# Patient Record
Sex: MALE | Race: OTHER | NOT HISPANIC OR LATINO
[De-identification: names, ages, dates, MRNs, and addresses within clinical notes are randomized per-mention and may not be internally consistent; named-entity substitution may affect disease eponyms.]

---

## 2018-05-08 PROBLEM — Z00.00 ENCOUNTER FOR PREVENTIVE HEALTH EXAMINATION: Status: ACTIVE | Noted: 2018-05-08

## 2019-05-10 ENCOUNTER — RESULT REVIEW (OUTPATIENT)
Age: 34
End: 2019-05-10

## 2019-05-10 ENCOUNTER — OUTPATIENT (OUTPATIENT)
Dept: OUTPATIENT SERVICES | Facility: HOSPITAL | Age: 34
LOS: 1 days | End: 2019-05-10
Payer: COMMERCIAL

## 2019-05-10 ENCOUNTER — APPOINTMENT (OUTPATIENT)
Dept: ULTRASOUND IMAGING | Facility: CLINIC | Age: 34
End: 2019-05-10
Payer: COMMERCIAL

## 2019-05-10 DIAGNOSIS — Z00.8 ENCOUNTER FOR OTHER GENERAL EXAMINATION: ICD-10-CM

## 2019-05-10 PROCEDURE — 88173 CYTOPATH EVAL FNA REPORT: CPT

## 2019-05-10 PROCEDURE — 10005 FNA BX W/US GDN 1ST LES: CPT

## 2019-05-10 PROCEDURE — 88173 CYTOPATH EVAL FNA REPORT: CPT | Mod: 26

## 2019-05-13 LAB — NON-GYNECOLOGICAL CYTOLOGY STUDY: SIGNIFICANT CHANGE UP

## 2020-10-19 ENCOUNTER — OUTPATIENT (OUTPATIENT)
Dept: OUTPATIENT SERVICES | Facility: HOSPITAL | Age: 35
LOS: 1 days | End: 2020-10-19
Payer: COMMERCIAL

## 2020-10-19 ENCOUNTER — APPOINTMENT (OUTPATIENT)
Dept: ULTRASOUND IMAGING | Facility: CLINIC | Age: 35
End: 2020-10-19
Payer: COMMERCIAL

## 2020-10-19 DIAGNOSIS — Z00.8 ENCOUNTER FOR OTHER GENERAL EXAMINATION: ICD-10-CM

## 2020-10-19 PROCEDURE — 76536 US EXAM OF HEAD AND NECK: CPT | Mod: 26

## 2020-10-19 PROCEDURE — 76536 US EXAM OF HEAD AND NECK: CPT

## 2022-05-20 ENCOUNTER — APPOINTMENT (OUTPATIENT)
Dept: PAIN MANAGEMENT | Facility: CLINIC | Age: 37
End: 2022-05-20

## 2022-11-01 ENCOUNTER — APPOINTMENT (OUTPATIENT)
Dept: BARIATRICS | Facility: CLINIC | Age: 37
End: 2022-11-01
Payer: COMMERCIAL

## 2022-11-01 ENCOUNTER — APPOINTMENT (OUTPATIENT)
Age: 37
End: 2022-11-01
Payer: COMMERCIAL

## 2022-11-01 VITALS
WEIGHT: 217 LBS | TEMPERATURE: 97.6 F | SYSTOLIC BLOOD PRESSURE: 137 MMHG | BODY MASS INDEX: 31.07 KG/M2 | HEART RATE: 90 BPM | HEIGHT: 70 IN | DIASTOLIC BLOOD PRESSURE: 93 MMHG | OXYGEN SATURATION: 96 %

## 2022-11-01 DIAGNOSIS — Z85.850 PERSONAL HISTORY OF MALIGNANT NEOPLASM OF THYROID: ICD-10-CM

## 2022-11-01 PROCEDURE — ZZZZZ: CPT

## 2022-11-01 PROCEDURE — G0447 BEHAVIOR COUNSEL OBESITY 15M: CPT | Mod: 59

## 2022-11-01 PROCEDURE — 99204 OFFICE O/P NEW MOD 45 MIN: CPT

## 2022-11-01 PROCEDURE — 36415 COLL VENOUS BLD VENIPUNCTURE: CPT

## 2022-11-01 RX ORDER — PAROXETINE HYDROCHLORIDE 37.5 MG/1
37.5 TABLET, FILM COATED, EXTENDED RELEASE ORAL
Refills: 0 | Status: ACTIVE | COMMUNITY

## 2022-11-01 RX ORDER — ALPRAZOLAM 0.5 MG/1
0.5 TABLET ORAL
Qty: 20 | Refills: 0 | Status: ACTIVE | COMMUNITY
Start: 2022-10-04

## 2022-11-01 RX ORDER — ATOGEPANT 60 MG/1
60 TABLET ORAL
Qty: 30 | Refills: 0 | Status: ACTIVE | COMMUNITY
Start: 2022-06-19

## 2022-11-01 RX ORDER — LEVOTHYROXINE SODIUM 175 UG/1
175 CAPSULE ORAL
Qty: 30 | Refills: 0 | Status: ACTIVE | COMMUNITY
Start: 2022-02-14

## 2022-11-12 NOTE — HISTORY OF PRESENT ILLNESS
[Improved Health] : Improved health [Quality of Life] : Quality of life [Improved Mobility] : Improved mobility [Commerial Program: _____] : commercial program: [unfilled] [Cravings] : cravings [Portions/overeating] : portions/overeating [5] : 5 [2+ miles] : Walking distance capability: 2+ miles [Sports] : sports [Other: ___] : [unfilled] [None] : none [FreeTextEntry2] : 185 [FreeTextEntry3] : 240 [0] : 0 [] : No [FreeTextEntry1] : 37 year old male with anxiety and h/o thyroid cancer presents for evaluation of obesity. He reports that he was always in good shape. However, in 2019 he was found to have Papillary thyroid carcinoma. He reports he had a total thyroidectomy but no TILLMAN. He is followed by Dr. Leonardo (Endocrine). He states he is complaint with taking Levothyroxine 175 mcg daily. Recent labs reviewed on patient's phone show his TSH is appropriately mildly suppressed. Denies any palpitations or other s/sx of hyperthyroidism. Prior to surgery he weighed 185 lbs. Since surgery his weight went up to 240 lbs. He has since dropped down to 217 lb by making dietary changes but would like to lose more weight. He works as a  and has most of his meals [place] the [employer]. He does make an effort to have healthier options but it is not always possible. He does on occasion work overnights. He is sedentary and does not exercise. IN the past he did try WW but was not successful. \par Recent labs from PCP were reviewed and discussed. \par His BP is somewhat elevated today. He feels well and denies any chest pain, palpitations or shortness of breath.

## 2022-11-12 NOTE — REVIEW OF SYSTEMS
[Patient Intake Form Reviewed] : Patient intake form was reviewed [Negative] : Heme/Lymph [MED-ROS-Cons-FT] : fatigue at times [MED-ROS-Allun-FT] : breana [MED-ROS-Neuro-FT] : migraines [MED-ROS-Musclo-FT] : b/l knee/hip pain [MED-ROS-Psych-FT] : anxiety [MED-ROS-Endo-FT] : h/o papillary thyroid cancer

## 2022-11-12 NOTE — ASSESSMENT
[FreeTextEntry1] : Patient with h/o papillary thyroid carcinoma, anxiety and obesity. We discussed the importance of weight loss in the management of his comorbidities. We discussed the risks of continued excess weight on long term health. Explained to the patient that if his TFTs are wnl he should not be experiencing weight gain due to his thyroid. Explained weight gain is related to lifestyle. Discussed lifestyle modification.  We discussed at length the importance of following a carbohydrate balanced diet and the importance of incorporating protein in meals. We also discussed appropriate alternative food choices. He met with the RD today as well. We discussed ways he can incorporate exercise into his daily routine. \par Pending today's labs will consider starting on medication to help with weight loss. \par \par We did discuss that Paxil can contribute to weight gain.\par \par BP mildly elevated will continue to monitor. Low sodium diet discussed.\par \par At least 15 minutes was spent during the visit on obesity counseling.\par All of his questions were answered and emotional support was provided.\par \par Fasting labs done today in the office, will call with results. \par

## 2022-11-15 LAB
ALBUMIN SERPL ELPH-MCNC: 5.1 G/DL
ALP BLD-CCNC: 87 U/L
ALT SERPL-CCNC: 32 U/L
ANION GAP SERPL CALC-SCNC: 15 MMOL/L
AST SERPL-CCNC: 23 U/L
BASOPHILS # BLD AUTO: 0.05 K/UL
BASOPHILS NFR BLD AUTO: 0.6 %
BILIRUB SERPL-MCNC: 0.4 MG/DL
BUN SERPL-MCNC: 11 MG/DL
CALCIUM SERPL-MCNC: 10 MG/DL
CHLORIDE SERPL-SCNC: 98 MMOL/L
CHOLEST SERPL-MCNC: 181 MG/DL
CO2 SERPL-SCNC: 29 MMOL/L
CREAT SERPL-MCNC: 1 MG/DL
EGFR: 98 ML/MIN/1.73M2
EOSINOPHIL # BLD AUTO: 0.13 K/UL
EOSINOPHIL NFR BLD AUTO: 1.5 %
ESTIMATED AVERAGE GLUCOSE: 103 MG/DL
GLUCOSE SERPL-MCNC: 54 MG/DL
HBA1C MFR BLD HPLC: 5.2 %
HCT VFR BLD CALC: 55.7 %
HDLC SERPL-MCNC: 57 MG/DL
HGB BLD-MCNC: 18.2 G/DL
IMM GRANULOCYTES NFR BLD AUTO: 0.6 %
INSULIN P FAST SERPL-ACNC: 25.5 UU/ML
LDLC SERPL CALC-MCNC: 91 MG/DL
LYMPHOCYTES # BLD AUTO: 2.92 K/UL
LYMPHOCYTES NFR BLD AUTO: 33.1 %
MAN DIFF?: NORMAL
MCHC RBC-ENTMCNC: 30.8 PG
MCHC RBC-ENTMCNC: 32.7 GM/DL
MCV RBC AUTO: 94.2 FL
MONOCYTES # BLD AUTO: 0.77 K/UL
MONOCYTES NFR BLD AUTO: 8.7 %
NEUTROPHILS # BLD AUTO: 4.89 K/UL
NEUTROPHILS NFR BLD AUTO: 55.5 %
NONHDLC SERPL-MCNC: 124 MG/DL
PLATELET # BLD AUTO: 302 K/UL
POTASSIUM SERPL-SCNC: 4.2 MMOL/L
PROT SERPL-MCNC: 7.7 G/DL
RBC # BLD: 5.91 M/UL
RBC # FLD: 13.5 %
SODIUM SERPL-SCNC: 142 MMOL/L
TRIGL SERPL-MCNC: 163 MG/DL
WBC # FLD AUTO: 8.81 K/UL

## 2022-12-22 ENCOUNTER — APPOINTMENT (OUTPATIENT)
Dept: BARIATRICS | Facility: CLINIC | Age: 37
End: 2022-12-22

## 2022-12-22 ENCOUNTER — APPOINTMENT (OUTPATIENT)
Age: 37
End: 2022-12-22
Payer: COMMERCIAL

## 2022-12-22 VITALS
SYSTOLIC BLOOD PRESSURE: 141 MMHG | BODY MASS INDEX: 31.21 KG/M2 | WEIGHT: 218 LBS | OXYGEN SATURATION: 97 % | HEART RATE: 82 BPM | DIASTOLIC BLOOD PRESSURE: 95 MMHG | TEMPERATURE: 97.6 F | HEIGHT: 70 IN

## 2022-12-22 PROCEDURE — ZZZZZ: CPT

## 2022-12-22 PROCEDURE — 99214 OFFICE O/P EST MOD 30 MIN: CPT

## 2022-12-22 PROCEDURE — G0447 BEHAVIOR COUNSEL OBESITY 15M: CPT | Mod: 59

## 2022-12-23 NOTE — HISTORY OF PRESENT ILLNESS
[FreeTextEntry1] : 37 year old male with anxiety and h/o thyroid cancer presents for evaluation of obesity. He reports that he was always in good shape. However, in 2019 he was found to have Papillary thyroid carcinoma. He reports he had a total thyroidectomy but no TILLMAN. He is followed by Dr. Leonardo (Endocrine). He states he is complaint with taking Levothyroxine 175 mcg daily. Recent labs reviewed on patient's phone show his TSH is appropriately mildly suppressed. Denies any palpitations or other s/sx of hyperthyroidism. Prior to surgery he weighed 185 lbs. Since surgery his weight went up to 240 lbs. He has since dropped down to 217 lb by making dietary changes but would like to lose more weight. He works as a  and has most of his meals at the [employer]. He does make an effort to have healthier options but it is not always possible. He does on occasion work overnights. He is sedentary and does not exercise. IN the past he did try WW but was not successful. \par Recent labs from PCP were reviewed and discussed. \par His BP is somewhat elevated today. He feels well and denies any chest pain, palpitations or shortness of breath. \par \par Since his last visit, he has been taking Metformin 500 mg BID to help with insulin resistance. He feels that it has helped greatly with his cravings for sweets. His weight is stable. He has been trying to make healthier choices at the [employer]. He has not been able to exercise consistently.

## 2022-12-23 NOTE — ASSESSMENT
[FreeTextEntry1] : Patient with h/o papillary thyroid carcinoma, anxiety and obesity. We discussed the importance of weight loss in the management of his comorbidities. We discussed the risks of continued excess weight on long term health. Discussed additional lifestyle modification. We discussed at length the importance of following a carbohydrate balanced diet and the importance of incorporating protein in meals. We also discussed appropriate alternative food choices. He met with the RD today as well. We discussed ways he can incorporate exercise into his daily routine. \par \par Will continue Metformin 500 mg BID. Will add on Mounjaro 2.5 mg weekly to help with insulin resistance and weight loss. Risks and benefits of therapy with Mounjaro were discussed. The proper way to take the medication, as well as, special considerations while on it were also discussed. The patient was taught how to use Mounjaro. We reviewed the injection site and technique, storage of the medication, dosing amount, time of administration and disposal of sharps. The risks and benefits of therapy with Mounjaro were discussed, as well as, possible side effects. Signs and symptoms of pancreatitis were reviewed as well. \par \par He will speak with his Endocrinologist to confirm that it is ok for him to start Mounjaro. \par \par We did discuss that Paxil can contribute to weight gain.\par \par BP mildly elevated will continue to monitor. Low sodium diet discussed.\par \par At least 15 minutes was spent during the visit on obesity counseling.\par All of his questions were answered and emotional support was provided.\par \par f/u in 6 weeks

## 2023-01-11 ENCOUNTER — RX RENEWAL (OUTPATIENT)
Age: 38
End: 2023-01-11

## 2023-02-10 ENCOUNTER — APPOINTMENT (OUTPATIENT)
Dept: BARIATRICS | Facility: CLINIC | Age: 38
End: 2023-02-10

## 2023-03-16 ENCOUNTER — RX RENEWAL (OUTPATIENT)
Age: 38
End: 2023-03-16

## 2023-04-23 ENCOUNTER — RX RENEWAL (OUTPATIENT)
Age: 38
End: 2023-04-23

## 2023-04-25 ENCOUNTER — APPOINTMENT (OUTPATIENT)
Dept: BARIATRICS/WEIGHT MGMT | Facility: CLINIC | Age: 38
End: 2023-04-25

## 2023-04-27 ENCOUNTER — APPOINTMENT (OUTPATIENT)
Dept: BARIATRICS/WEIGHT MGMT | Facility: CLINIC | Age: 38
End: 2023-04-27

## 2023-05-01 ENCOUNTER — APPOINTMENT (OUTPATIENT)
Dept: BARIATRICS/WEIGHT MGMT | Facility: CLINIC | Age: 38
End: 2023-05-01
Payer: COMMERCIAL

## 2023-05-01 VITALS
DIASTOLIC BLOOD PRESSURE: 85 MMHG | OXYGEN SATURATION: 98 % | WEIGHT: 213.19 LBS | SYSTOLIC BLOOD PRESSURE: 138 MMHG | HEIGHT: 70 IN | TEMPERATURE: 98.2 F | HEART RATE: 98 BPM | BODY MASS INDEX: 30.52 KG/M2

## 2023-05-01 PROCEDURE — 99213 OFFICE O/P EST LOW 20 MIN: CPT

## 2023-05-01 PROCEDURE — G0447 BEHAVIOR COUNSEL OBESITY 15M: CPT | Mod: 59

## 2023-05-01 NOTE — ASSESSMENT
[FreeTextEntry1] : Patient with h/o papillary thyroid carcinoma, anxiety and obesity. Spent time discussing the importance of lifestyle modifications. Discussed additional dietary changes he can incorporate. Reviewed importance of adequate protein intake to help build muscle while exercising. Also reviewed including Cardio in his exercise routine. At least 15 minutes was spent during the visit on obesity counseling. \par \par Will continue Metformin 500 mg BID and resume Mounjaro 7.5 mg weekly. Discussed the continued off label use of Mounjaro for insulin resistance. He understands the risks and prefers to continue. Will send over labs from PCP for my review given his h/o Papillary thyroid cancer. \par \par We did discuss that Paxil can contribute to weight gain.\par \par BP mildly elevated will continue to monitor. Low sodium diet discussed.\par \par All of his questions were answered and emotional support was provided.\par \par f/u in 3 months \par

## 2023-05-01 NOTE — HISTORY OF PRESENT ILLNESS
[FreeTextEntry1] : 37 year old male with anxiety and h/o thyroid cancer presents for evaluation of obesity. He reports that he was always in good shape. However, in 2019 he was found to have Papillary thyroid carcinoma. He reports he had a total thyroidectomy but no TILLMAN. He is followed by Dr. Leonardo (Endocrine). He states he is complaint with taking Levothyroxine 175 mcg daily. Recent labs reviewed on patient's phone show his TSH is appropriately mildly suppressed. Denies any palpitations or other s/sx of hyperthyroidism. Prior to surgery he weighed 185 lbs. Since surgery his weight went up to 240 lbs. He has since dropped down to 217 lb by making dietary changes but would like to lose more weight. He works as a  and has most of his meals at the [employer]. He does make an effort to have healthier options but it is not always possible. He does on occasion work overnights. He is sedentary and does not exercise. IN the past he did try WW but was not successful. \par Recent labs from PCP were reviewed and discussed. \par His BP is somewhat elevated today. He feels well and denies any chest pain, palpitations or shortness of breath. \par \par Since his last visit, he has been taking Metformin 500 mg BID  and Mounjaro to help with insulin resistance. He was taking Mounjaro 7.5 mg weekly. He felt great and had a lot of energy. He reports that he also lost weight and was down to 195 lbs. About 3 weeks ago he was unable to get the medication and his weight increased by 10 lbs. Today on his home scale he weighed 205 lbs. He is frustrated that he regained the weight so quickly. 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 He eats at the [employer] but does try to make healthier choices. He endorses enjoying fruits rather than sweets now. He started exercising with a personal golf  several times a week. They focus on strength training and golfing. \par Recently had labs with PCP, was told all was wnl.

## 2023-06-11 ENCOUNTER — RX RENEWAL (OUTPATIENT)
Age: 38
End: 2023-06-11

## 2023-08-08 ENCOUNTER — APPOINTMENT (OUTPATIENT)
Dept: BARIATRICS/WEIGHT MGMT | Facility: CLINIC | Age: 38
End: 2023-08-08
Payer: COMMERCIAL

## 2023-08-08 VITALS
DIASTOLIC BLOOD PRESSURE: 93 MMHG | HEART RATE: 72 BPM | OXYGEN SATURATION: 100 % | TEMPERATURE: 98.1 F | WEIGHT: 178.31 LBS | HEIGHT: 70 IN | BODY MASS INDEX: 25.53 KG/M2 | SYSTOLIC BLOOD PRESSURE: 127 MMHG

## 2023-08-08 PROCEDURE — G0447 BEHAVIOR COUNSEL OBESITY 15M: CPT | Mod: 59

## 2023-08-08 PROCEDURE — 99213 OFFICE O/P EST LOW 20 MIN: CPT

## 2023-08-08 NOTE — HISTORY OF PRESENT ILLNESS
[FreeTextEntry1] : 37 year old male with anxiety and h/o thyroid cancer presents for evaluation of obesity. He reports that he was always in good shape. However, in 2019 he was found to have Papillary thyroid carcinoma. He reports he had a total thyroidectomy but no TILLMAN. He is followed by Dr. Leonardo (Endocrine). He states he is complaint with taking Levothyroxine 175 mcg daily. Recent labs reviewed on patient's phone show his TSH is appropriately mildly suppressed. Denies any palpitations or other s/sx of hyperthyroidism. Prior to surgery he weighed 185 lbs. Since surgery his weight went up to 240 lbs. He has since dropped down to 217 lb by making dietary changes but would like to lose more weight. He works as a  and has most of his meals at the [employer]. He does make an effort to have healthier options but it is not always possible. He does on occasion work overnights. He is sedentary and does not exercise. IN the past he did try WW but was not successful.  Recent labs from PCP were reviewed and discussed.  His BP is somewhat elevated today. He feels well and denies any chest pain, palpitations or shortness of breath.   8/8/23 Since his last visit, he has been taking Metformin 500 mg BID and Mounjaro to help with insulin resistance. He was taking Mounjaro 7.5 mg weekly. He feels great. Reports having good energy. Since his last visit, he injured his shoulder while at work and has been home. He started running to exercise. He continues to work with the  where he focuses on strength training. Continues to make healthy choices.  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

## 2023-08-08 NOTE — ASSESSMENT
[FreeTextEntry1] : Patient with h/o papillary thyroid carcinoma, anxiety and obesity. Continue to focus on following a healthy lifestyle. At least 15 minutes was spent during the visit on obesity counseling.  Will continue Metformin 500 mg BID and Mounjaro 7.5 mg weekly. Discussed the continued off label use of Mounjaro for insulin resistance. He understands the risks and prefers to continue. Will send over labs from fire dept physical. Advised that given significant weight loss Levothyroxine dose may need to be adjusted and given his h/o Papillary thyroid cancer.   BP mildly elevated will continue to monitor. Low sodium diet discussed. He is drinking an energy drink today. Advised against it.   All of his questions were answered, and emotional support was provided.  f/u in 3 months

## 2023-08-11 RX ORDER — METFORMIN ER 500 MG 500 MG/1
500 TABLET ORAL
Qty: 180 | Refills: 2 | Status: ACTIVE | COMMUNITY
Start: 2022-11-15 | End: 1900-01-01

## 2023-10-25 ENCOUNTER — NON-APPOINTMENT (OUTPATIENT)
Age: 38
End: 2023-10-25

## 2023-10-30 ENCOUNTER — APPOINTMENT (OUTPATIENT)
Dept: SPINE | Facility: CLINIC | Age: 38
End: 2023-10-30
Payer: COMMERCIAL

## 2023-10-30 ENCOUNTER — APPOINTMENT (OUTPATIENT)
Dept: SPINE | Facility: CLINIC | Age: 38
End: 2023-10-30

## 2023-10-30 VITALS
SYSTOLIC BLOOD PRESSURE: 115 MMHG | DIASTOLIC BLOOD PRESSURE: 85 MMHG | HEART RATE: 90 BPM | WEIGHT: 180 LBS | BODY MASS INDEX: 25.77 KG/M2 | HEIGHT: 70 IN | OXYGEN SATURATION: 99 %

## 2023-10-30 DIAGNOSIS — S32.009A UNSPECIFIED FRACTURE OF UNSPECIFIED LUMBAR VERTEBRA, INITIAL ENCOUNTER FOR CLOSED FRACTURE: ICD-10-CM

## 2023-10-30 PROCEDURE — 99203 OFFICE O/P NEW LOW 30 MIN: CPT

## 2023-10-30 RX ORDER — TRAMADOL HYDROCHLORIDE 50 MG/1
50 TABLET, COATED ORAL
Qty: 40 | Refills: 0 | Status: DISCONTINUED | COMMUNITY
Start: 2022-10-18 | End: 2023-10-30

## 2023-11-09 ENCOUNTER — RX RENEWAL (OUTPATIENT)
Age: 38
End: 2023-11-09

## 2023-11-22 ENCOUNTER — APPOINTMENT (OUTPATIENT)
Dept: BARIATRICS | Facility: CLINIC | Age: 38
End: 2023-11-22

## 2023-11-30 ENCOUNTER — APPOINTMENT (OUTPATIENT)
Dept: SPINE | Facility: CLINIC | Age: 38
End: 2023-11-30

## 2023-12-06 ENCOUNTER — RX RENEWAL (OUTPATIENT)
Age: 38
End: 2023-12-06

## 2023-12-21 RX ORDER — TIRZEPATIDE 10 MG/.5ML
10 INJECTION, SOLUTION SUBCUTANEOUS
Qty: 2 | Refills: 0 | Status: DISCONTINUED | COMMUNITY
Start: 2022-12-22 | End: 2023-12-21

## 2023-12-29 ENCOUNTER — APPOINTMENT (OUTPATIENT)
Dept: BARIATRICS | Facility: CLINIC | Age: 38
End: 2023-12-29
Payer: COMMERCIAL

## 2023-12-29 VITALS
BODY MASS INDEX: 27.98 KG/M2 | OXYGEN SATURATION: 97 % | SYSTOLIC BLOOD PRESSURE: 122 MMHG | HEART RATE: 79 BPM | HEIGHT: 70 IN | TEMPERATURE: 97.6 F | WEIGHT: 195.44 LBS | DIASTOLIC BLOOD PRESSURE: 83 MMHG

## 2023-12-29 DIAGNOSIS — E89.0 POSTPROCEDURAL HYPOTHYROIDISM: ICD-10-CM

## 2023-12-29 DIAGNOSIS — E88.819 INSULIN RESISTANCE, UNSPECIFIED: ICD-10-CM

## 2023-12-29 DIAGNOSIS — F41.9 ANXIETY DISORDER, UNSPECIFIED: ICD-10-CM

## 2023-12-29 DIAGNOSIS — E66.9 OBESITY, UNSPECIFIED: ICD-10-CM

## 2023-12-29 PROCEDURE — G0447 BEHAVIOR COUNSEL OBESITY 15M: CPT | Mod: 59

## 2023-12-29 PROCEDURE — 99214 OFFICE O/P EST MOD 30 MIN: CPT

## 2023-12-29 NOTE — ASSESSMENT
[FreeTextEntry1] : Patient with h/o papillary thyroid carcinoma, anxiety and obesity. Continue to focus on following a healthy lifestyle. At least 15 minutes was spent during the visit on obesity counseling.  Will continue Metformin 500 mg BID and Zepbound 10 mg weekly.   BP wnl  All of his questions were answered, and emotional support was provided.  Fasting labs done today in the office, will call with results.   f/u in 6 months.

## 2023-12-29 NOTE — HISTORY OF PRESENT ILLNESS
[FreeTextEntry1] : 37 year old male with anxiety and h/o thyroid cancer presents for evaluation of obesity. He reports that he was always in good shape. However, in 2019 he was found to have Papillary thyroid carcinoma. He reports he had a total thyroidectomy but no TILLMAN. He is followed by Dr. Leonardo (Endocrine). He states he is complaint with taking Levothyroxine 175 mcg daily. Recent labs reviewed on patient's phone show his TSH is appropriately mildly suppressed. Denies any palpitations or other s/sx of hyperthyroidism. Prior to surgery he weighed 185 lbs. Since surgery his weight went up to 240 lbs. He has since dropped down to 217 lb by making dietary changes but would like to lose more weight. He works as a  and has most of his meals at the [employer]. He does make an effort to have healthier options but it is not always possible. He does on occasion work overnights. He is sedentary and does not exercise. IN the past he did try WW but was not successful.  Recent labs from PCP were reviewed and discussed.  His BP is somewhat elevated today. He feels well and denies any chest pain, palpitations or shortness of breath.   8/8/23 Since his last visit, he has been taking Metformin 500 mg BID and Mounjaro to help with insulin resistance. He was taking Mounjaro 7.5 mg weekly. He feels great. Reports having good energy. Since his last visit, he injured his shoulder while at work and has been home. He started running to exercise. He continues to work with the  where he focuses on strength training. Continues to make healthy choices.  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   12/29/23 Since his last visit, he has been struggling with exercise as he underwent 2 should surgeries. During his recovery of the second shoulder he fell and fractured his lumbar vertebra. Currently he attends PT but is not allowed to exercise. Reports he has been trying to keep up with dietary changes but has been struggling due to stress. He has gained some weight since his last visit.  He was taking Mounjaro 7.5 mg weekly. Recently dose was increased, and patient switched to Zepbound.

## 2024-01-04 LAB
25(OH)D3 SERPL-MCNC: 29.7 NG/ML
ALBUMIN SERPL ELPH-MCNC: 5.2 G/DL
ALP BLD-CCNC: 77 U/L
ALT SERPL-CCNC: 38 U/L
ANION GAP SERPL CALC-SCNC: 13 MMOL/L
AST SERPL-CCNC: 26 U/L
BASOPHILS # BLD AUTO: 0.05 K/UL
BASOPHILS NFR BLD AUTO: 0.8 %
BILIRUB SERPL-MCNC: 0.8 MG/DL
BUN SERPL-MCNC: 14 MG/DL
CALCIUM SERPL-MCNC: 9.7 MG/DL
CHLORIDE SERPL-SCNC: 103 MMOL/L
CHOLEST SERPL-MCNC: 219 MG/DL
CO2 SERPL-SCNC: 24 MMOL/L
CREAT SERPL-MCNC: 0.84 MG/DL
EGFR: 113 ML/MIN/1.73M2
EOSINOPHIL # BLD AUTO: 0.08 K/UL
EOSINOPHIL NFR BLD AUTO: 1.2 %
ESTIMATED AVERAGE GLUCOSE: 97 MG/DL
FOLATE SERPL-MCNC: 15.3 NG/ML
GLUCOSE SERPL-MCNC: 90 MG/DL
HBA1C MFR BLD HPLC: 5 %
HCT VFR BLD CALC: 49.2 %
HDLC SERPL-MCNC: 62 MG/DL
HGB BLD-MCNC: 16.8 G/DL
IMM GRANULOCYTES NFR BLD AUTO: 0.3 %
LDLC SERPL CALC-MCNC: 148 MG/DL
LYMPHOCYTES # BLD AUTO: 1.88 K/UL
LYMPHOCYTES NFR BLD AUTO: 29.2 %
MAN DIFF?: NORMAL
MCHC RBC-ENTMCNC: 30.5 PG
MCHC RBC-ENTMCNC: 34.1 GM/DL
MCV RBC AUTO: 89.5 FL
MONOCYTES # BLD AUTO: 0.45 K/UL
MONOCYTES NFR BLD AUTO: 7 %
NEUTROPHILS # BLD AUTO: 3.95 K/UL
NEUTROPHILS NFR BLD AUTO: 61.5 %
NONHDLC SERPL-MCNC: 157 MG/DL
PLATELET # BLD AUTO: 278 K/UL
POTASSIUM SERPL-SCNC: 4.7 MMOL/L
PROT SERPL-MCNC: 7.6 G/DL
RBC # BLD: 5.5 M/UL
RBC # FLD: 13.2 %
SODIUM SERPL-SCNC: 139 MMOL/L
T4 FREE SERPL-MCNC: 2.7 NG/DL
T4 SERPL-MCNC: 14.1 UG/DL
TRIGL SERPL-MCNC: 55 MG/DL
TSH SERPL-ACNC: 0.06 UIU/ML
VIT B12 SERPL-MCNC: 955 PG/ML
WBC # FLD AUTO: 6.43 K/UL

## 2024-01-05 RX ORDER — TIRZEPATIDE 10 MG/.5ML
10 INJECTION, SOLUTION SUBCUTANEOUS
Qty: 1 | Refills: 2 | Status: ACTIVE | COMMUNITY
Start: 2023-12-21

## 2024-01-30 ENCOUNTER — TRANSCRIPTION ENCOUNTER (OUTPATIENT)
Age: 39
End: 2024-01-30

## 2024-08-14 ENCOUNTER — APPOINTMENT (OUTPATIENT)
Dept: BARIATRICS | Facility: CLINIC | Age: 39
End: 2024-08-14
Payer: COMMERCIAL

## 2024-08-14 VITALS — WEIGHT: 205 LBS | BODY MASS INDEX: 29.41 KG/M2

## 2024-08-14 DIAGNOSIS — E89.0 POSTPROCEDURAL HYPOTHYROIDISM: ICD-10-CM

## 2024-08-14 DIAGNOSIS — E66.9 OBESITY, UNSPECIFIED: ICD-10-CM

## 2024-08-14 DIAGNOSIS — E88.819 INSULIN RESISTANCE, UNSPECIFIED: ICD-10-CM

## 2024-08-14 PROCEDURE — 99213 OFFICE O/P EST LOW 20 MIN: CPT

## 2024-08-16 NOTE — REASON FOR VISIT
[Follow-Up] : a follow-up visit [Home] : at home, [unfilled] , at the time of the visit. [Medical Office: ([facility])___] : at the medical office located in  [Patient] : the patient [Self] : self [This encounter was initiated by telehealth (audio with video) and converted to telephone (audio only) due to technical difficulties.] : This encounter was initiated by telehealth (audio with video) and converted to telephone (audio only) due to technical difficulties.

## 2024-08-16 NOTE — HISTORY OF PRESENT ILLNESS
[FreeTextEntry1] : 37 year old male with anxiety and h/o thyroid cancer presents for evaluation of obesity. He reports that he was always in good shape. However, in 2019 he was found to have Papillary thyroid carcinoma. He reports he had a total thyroidectomy but no TILLMAN. He is followed by Dr. Leonardo (Endocrine). He states he is complaint with taking Levothyroxine 175 mcg daily. Recent labs reviewed on patient's phone show his TSH is appropriately mildly suppressed. Denies any palpitations or other s/sx of hyperthyroidism. Prior to surgery he weighed 185 lbs. Since surgery his weight went up to 240 lbs. He has since dropped down to 217 lb by making dietary changes but would like to lose more weight. He works as a  and has most of his meals at the [employer]. He does make an effort to have healthier options but it is not always possible. He does on occasion work overnights. He is sedentary and does not exercise. IN the past he did try WW but was not successful.  Recent labs from PCP were reviewed and discussed.  His BP is somewhat elevated today. He feels well and denies any chest pain, palpitations or shortness of breath.   8/8/23 Since his last visit, he has been taking Metformin 500 mg BID and Mounjaro to help with insulin resistance. He was taking Mounjaro 7.5 mg weekly. He feels great. Reports having good energy. Since his last visit, he injured his shoulder while at work and has been home. He started running to exercise. He continues to work with the  where he focuses on strength training. Continues to make healthy choices.  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   12/29/23 Since his last visit, he has been struggling with exercise as he underwent 2 should surgeries. During his recovery of the second shoulder he fell and fractured his lumbar vertebra. Currently he attends PT but is not allowed to exercise. Reports he has been trying to keep up with dietary changes but has been struggling due to stress. He has gained some weight since his last visit.  He was taking Mounjaro 7.5 mg weekly. Recently dose was increased, and patient switched to Zepbound.   8/14/24 States that he has been off of AOM d/t to change of coverage from his insurance. Since being off the medication he has regained some weight despite being consistent with lifestyle modification efforts. He is frustrated by this. Overall feels good. He does currently have pneumonia.  Had recent labs with PCP will obtain copy for review.

## 2024-08-16 NOTE — ASSESSMENT
[FreeTextEntry1] : Patient with h/o papillary thyroid carcinoma, anxiety and obesity. Continue to focus on following a healthy lifestyle. At least 15 minutes was spent during the visit on obesity counseling.  Will try to check coverage for Wegovy. R+B discussed. If approved will train patient.   BP has been wnl.  All of his questions were answered, and emotional support was provided.  f/u pending tx plan

## 2024-08-16 NOTE — REASON FOR VISIT
[Follow-Up] : a follow-up visit [Home] : at home, [unfilled] , at the time of the visit. [Medical Office: ([facility])___] : at the medical office located in  [Patient] : the patient [Self] : self [This encounter was initiated by telehealth (audio with video) and converted to telephone (audio only) due to technical difficulties.] : This encounter was initiated by telehealth (audio with video) and converted to telephone (audio only) due to technical difficulties.

## 2024-08-16 NOTE — HISTORY OF PRESENT ILLNESS
[FreeTextEntry1] : 37 year old male with anxiety and h/o thyroid cancer presents for evaluation of obesity. He reports that he was always in good shape. However, in 2019 he was found to have Papillary thyroid carcinoma. He reports he had a total thyroidectomy but no TILLMAN. He is followed by Dr. Leonardo (Endocrine). He states he is complaint with taking Levothyroxine 175 mcg daily. Recent labs reviewed on patient's phone show his TSH is appropriately mildly suppressed. Denies any palpitations or other s/sx of hyperthyroidism. Prior to surgery he weighed 185 lbs. Since surgery his weight went up to 240 lbs. He has since dropped down to 217 lb by making dietary changes but would like to lose more weight. He works as a  and has most of his meals at the [employer]. He does make an effort to have healthier options but it is not always possible. He does on occasion work overnights. He is sedentary and does not exercise. IN the past he did try WW but was not successful.  Recent labs from PCP were reviewed and discussed.  His BP is somewhat elevated today. He feels well and denies any chest pain, palpitations or shortness of breath.   8/8/23 Since his last visit, he has been taking Metformin 500 mg BID and Mounjaro to help with insulin resistance. He was taking Mounjaro 7.5 mg weekly. He feels great. Reports having good energy. Since his last visit, he injured his shoulder while at work and has been home. He started running to exercise. He continues to work with the  where he focuses on strength training. Continues to make healthy choices.  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   12/29/23 Since his last visit, he has been struggling with exercise as he underwent 2 should surgeries. During his recovery of the second shoulder he fell and fractured his lumbar vertebra. Currently he attends PT but is not allowed to exercise. Reports he has been trying to keep up with dietary changes but has been struggling due to stress. He has gained some weight since his last visit.  He was taking Mounjaro 7.5 mg weekly. Recently dose was increased, and patient switched to Zepbound.   8/14/24 States that he has been off of AOM d/t to change of coverage from his insurance. Since being off the medication he has regained some weight despite being consistent with lifestyle modification efforts. He is frustrated by this. Overall feels good. He does currently have pneumonia.  Had recent labs with PCP will obtain copy for review.

## 2024-08-20 RX ORDER — SEMAGLUTIDE 0.25 MG/.5ML
0.25 INJECTION, SOLUTION SUBCUTANEOUS
Qty: 1 | Refills: 0 | Status: ACTIVE | COMMUNITY
Start: 2024-08-14

## 2024-08-28 ENCOUNTER — RX RENEWAL (OUTPATIENT)
Age: 39
End: 2024-08-28

## 2024-10-31 ENCOUNTER — APPOINTMENT (OUTPATIENT)
Dept: BARIATRICS | Facility: CLINIC | Age: 39
End: 2024-10-31

## 2024-11-14 ENCOUNTER — APPOINTMENT (OUTPATIENT)
Dept: BARIATRICS | Facility: CLINIC | Age: 39
End: 2024-11-14
Payer: COMMERCIAL

## 2024-11-14 VITALS — BODY MASS INDEX: 30.13 KG/M2 | WEIGHT: 210 LBS

## 2024-11-14 DIAGNOSIS — E66.9 OBESITY, UNSPECIFIED: ICD-10-CM

## 2024-11-14 DIAGNOSIS — E89.0 POSTPROCEDURAL HYPOTHYROIDISM: ICD-10-CM

## 2024-11-14 DIAGNOSIS — E88.819 INSULIN RESISTANCE, UNSPECIFIED: ICD-10-CM

## 2024-11-14 PROCEDURE — 99214 OFFICE O/P EST MOD 30 MIN: CPT | Mod: 95

## 2024-11-14 PROCEDURE — G0447 BEHAVIOR COUNSEL OBESITY 15M: CPT | Mod: 59,95

## 2024-11-14 NOTE — ASSESSMENT
[FreeTextEntry1] : Patient with h/o papillary thyroid carcinoma, anxiety and obesity. Continue to focus on following a healthy lifestyle. I have recommended he f/u with the RD for additional education. At least 15 minutes was spent during the visit on obesity counseling.  He will try to pay for Tirzepatide OOP and use the manufacture coupon.  Continue thyroid medication. Will need labs at f/u to check TFTs and adjust dose given weight gain.  Will go for eval with Card to r/o CAD  All of his questions were answered, and emotional support was provided.  f/u pending tx plan

## 2024-11-14 NOTE — HISTORY OF PRESENT ILLNESS
[FreeTextEntry1] : 37 year old male with anxiety and h/o thyroid cancer presents for evaluation of obesity. He reports that he was always in good shape. However, in 2019 he was found to have Papillary thyroid carcinoma. He reports he had a total thyroidectomy but no VILLAR. He is followed by Dr. Skaggs (Endocrine). He states he is complaint with taking Levothyroxine 175 mcg daily. Recent labs reviewed on patient's phone show his TSH is appropriately mildly suppressed. Denies any palpitations or other s/sx of hyperthyroidism. Prior to surgery he weighed 185 lbs. Since surgery his weight went up to 240 lbs. He has since dropped down to 217 lb by making dietary changes but would like to lose more weight. He works as a  and has most of his meals at the [employer]. He does make an effort to have healthier options but it is not always possible. He does on occasion work overnights. He is sedentary and does not exercise. IN the past he did try WW but was not successful.  Recent labs from PCP were reviewed and discussed.  His BP is somewhat elevated today. He feels well and denies any chest pain, palpitations or shortness of breath.   8/8/23 Since his last visit, he has been taking Metformin 500 mg BID and Mounjaro to help with insulin resistance. He was taking Mounjaro 7.5 mg weekly. He feels great. Reports having good energy. Since his last visit, he injured his shoulder while at work and has been home. He started running to exercise. He continues to work with the  where he focuses on strength training. Continues to make healthy choices.  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   12/29/23 Since his last visit, he has been struggling with exercise as he underwent 2 should surgeries. During his recovery of the second shoulder he fell and fractured his lumbar vertebra. Currently he attends PT but is not allowed to exercise. Reports he has been trying to keep up with dietary changes but has been struggling due to stress. He has gained some weight since his last visit.  He was taking Mounjaro 7.5 mg weekly. Recently dose was increased, and patient switched to Zepbound.   8/14/24 States that he has been off of AOM d/t to change of coverage from his insurance. Since being off the medication he has regained some weight despite being consistent with lifestyle modification efforts. He is frustrated by this. Overall feels good. He does currently have pneumonia.  Had recent labs with PCP will obtain copy for review.  11/14/24 Since his last visit, he was not able to get the medication. Reports being consistent with lifestyle changes. He gained 5 more lbs. His ins does not cover AOM. Consistent with thyroid medication.

## 2024-11-14 NOTE — PHYSICAL EXAM
[FreeTextEntry1] : General: Awake, alert, non- diaphoretic. In no acute distress. Well nourished. Head: Normocephalic Skin: No obvious bruises or rashes on hands and face. There are no typical cushingoid features. Eyes: No swelling, lid lag or orbitopathy. No conjunctival injection Thyroid: No obvious goiter Respiratory: no increased respiratory effort. Able to speak in clear sentences. Neurological: Speech normal rate. Psychiatric: normal affect. Cooperative Musculoskeletal: UE- free range of motion Extremities: No tremor.

## 2025-03-12 ENCOUNTER — RX RENEWAL (OUTPATIENT)
Age: 40
End: 2025-03-12

## 2025-09-19 ENCOUNTER — RX RENEWAL (OUTPATIENT)
Age: 40
End: 2025-09-19